# Patient Record
Sex: FEMALE | Race: WHITE | NOT HISPANIC OR LATINO | Employment: FULL TIME | URBAN - METROPOLITAN AREA
[De-identification: names, ages, dates, MRNs, and addresses within clinical notes are randomized per-mention and may not be internally consistent; named-entity substitution may affect disease eponyms.]

---

## 2017-03-07 ENCOUNTER — OFFICE VISIT (OUTPATIENT)
Dept: FAMILY MEDICINE CLINIC | Facility: CLINIC | Age: 47
End: 2017-03-07

## 2017-03-07 VITALS
RESPIRATION RATE: 18 BRPM | BODY MASS INDEX: 24.59 KG/M2 | HEART RATE: 90 BPM | SYSTOLIC BLOOD PRESSURE: 116 MMHG | OXYGEN SATURATION: 97 % | WEIGHT: 144 LBS | DIASTOLIC BLOOD PRESSURE: 74 MMHG | HEIGHT: 64 IN

## 2017-03-07 DIAGNOSIS — G58.9 PINCHED NERVE IN NECK: Primary | ICD-10-CM

## 2017-03-07 PROCEDURE — 99213 OFFICE O/P EST LOW 20 MIN: CPT | Performed by: NURSE PRACTITIONER

## 2017-03-07 PROCEDURE — 96372 THER/PROPH/DIAG INJ SC/IM: CPT | Performed by: NURSE PRACTITIONER

## 2017-03-07 RX ORDER — METHYLPREDNISOLONE ACETATE 80 MG/ML
80 INJECTION, SUSPENSION INTRA-ARTICULAR; INTRALESIONAL; INTRAMUSCULAR; SOFT TISSUE ONCE
Status: COMPLETED | OUTPATIENT
Start: 2017-03-07 | End: 2017-03-07

## 2017-03-07 RX ORDER — METHOCARBAMOL 500 MG/1
500 TABLET, FILM COATED ORAL 3 TIMES DAILY
Qty: 9 TABLET | Refills: 0 | Status: SHIPPED | OUTPATIENT
Start: 2017-03-07 | End: 2017-03-07

## 2017-03-07 RX ORDER — METHOCARBAMOL 500 MG/1
500 TABLET, FILM COATED ORAL 4 TIMES DAILY
Qty: 12 TABLET | Refills: 0 | Status: SHIPPED | OUTPATIENT
Start: 2017-03-07 | End: 2017-03-10

## 2017-03-07 RX ADMIN — METHYLPREDNISOLONE ACETATE 80 MG: 80 INJECTION, SUSPENSION INTRA-ARTICULAR; INTRALESIONAL; INTRAMUSCULAR; SOFT TISSUE at 10:58

## 2017-03-07 NOTE — PROGRESS NOTES
Subjective   Karen Almonte is a 46 y.o. female.   Chief Complaint   Patient presents with   • Neck Pain     can not move neck, trouble sleeping, laying down causes tremendous pain. any pressure is unbarable. pain scale over 10.    • Back Pain     back and shoudler. visible swelling in shoulders      Vitals:    03/07/17 0959   BP: 116/74   Pulse: 90   Resp: 18   SpO2: 97%     Allergies   Allergen Reactions   • Azithromycin Nausea And Vomiting   • Chlorpromazine    • Erythromycin    • Meperidine      HPI Comments: Picked up three tier shelf last Wednesday.   Pain is continuing to get worse. Worse when laying down.   Has done a lot of stretching so she was able to sleep a little.     Neck Pain    This is a new problem. The pain is associated with lifting a heavy object and a twisting injury. The quality of the pain is described as stabbing and burning. The pain is at a severity of 10/10. The pain is severe. The symptoms are aggravated by twisting and bending. The pain is same all the time. Associated symptoms include headaches (occipital ) and tingling. Pertinent negatives include no numbness or weakness. She has tried NSAIDs and heat for the symptoms. The treatment provided moderate relief.        The following portions of the patient's history were reviewed and updated as appropriate: allergies, current medications, past family history, past medical history, past social history, past surgical history and problem list.    Review of Systems   Musculoskeletal: Positive for back pain, myalgias and neck pain.   Neurological: Positive for tingling and headaches (occipital ). Negative for weakness and numbness.   All other systems reviewed and are negative.      Objective   Physical Exam   Constitutional: She is oriented to person, place, and time. She appears well-developed and well-nourished.   HENT:   Head: Normocephalic and atraumatic.   Right Ear: External ear normal.   Left Ear: External ear normal.   Mouth/Throat:  Oropharynx is clear and moist.   Neck: Normal range of motion. Neck supple.   Cardiovascular: Normal rate, regular rhythm and normal heart sounds.    Pulmonary/Chest: Effort normal and breath sounds normal.   Musculoskeletal: Normal range of motion. She exhibits tenderness.        Left shoulder: She exhibits tenderness, pain and spasm. She exhibits normal strength.        Arms:  Neurological: She is alert and oriented to person, place, and time.   Skin: Skin is warm and dry.   Psychiatric: She has a normal mood and affect. Her behavior is normal. Judgment and thought content normal.   Nursing note and vitals reviewed.      Assessment/Plan   Problems Addressed this Visit     None      Visit Diagnoses     Pinched nerve in neck    -  Primary    Relevant Medications    methylPREDNISolone acetate (DEPO-medrol) injection 80 mg (Start on 3/7/2017 11:00 AM)    methocarbamol (ROBAXIN) 500 MG tablet

## 2017-05-19 RX ORDER — GABAPENTIN 600 MG/1
TABLET ORAL
Qty: 270 TABLET | Refills: 0 | Status: SHIPPED | OUTPATIENT
Start: 2017-05-19 | End: 2017-07-06 | Stop reason: SDUPTHER

## 2017-05-24 RX ORDER — DULOXETIN HYDROCHLORIDE 60 MG/1
CAPSULE, DELAYED RELEASE ORAL
Qty: 90 CAPSULE | Refills: 0 | Status: SHIPPED | OUTPATIENT
Start: 2017-05-24 | End: 2017-07-06 | Stop reason: SDUPTHER

## 2017-07-06 RX ORDER — GABAPENTIN 600 MG/1
600 TABLET ORAL 3 TIMES DAILY
Qty: 270 TABLET | Refills: 0 | Status: SHIPPED | OUTPATIENT
Start: 2017-07-06 | End: 2017-10-10 | Stop reason: SDUPTHER

## 2017-07-06 RX ORDER — DULOXETIN HYDROCHLORIDE 60 MG/1
60 CAPSULE, DELAYED RELEASE ORAL DAILY
Qty: 90 CAPSULE | Refills: 1 | Status: SHIPPED | OUTPATIENT
Start: 2017-07-06 | End: 2017-10-10 | Stop reason: SDUPTHER

## 2017-09-12 ENCOUNTER — OFFICE VISIT (OUTPATIENT)
Dept: FAMILY MEDICINE CLINIC | Facility: CLINIC | Age: 47
End: 2017-09-12

## 2017-09-12 VITALS
WEIGHT: 149 LBS | TEMPERATURE: 98.3 F | HEART RATE: 92 BPM | BODY MASS INDEX: 25.44 KG/M2 | HEIGHT: 64 IN | OXYGEN SATURATION: 99 % | DIASTOLIC BLOOD PRESSURE: 68 MMHG | SYSTOLIC BLOOD PRESSURE: 120 MMHG

## 2017-09-12 DIAGNOSIS — R14.0 ABDOMINAL DISTENTION: ICD-10-CM

## 2017-09-12 DIAGNOSIS — K59.09 OTHER CONSTIPATION: ICD-10-CM

## 2017-09-12 DIAGNOSIS — R10.84 GENERALIZED ABDOMINAL PAIN: Primary | ICD-10-CM

## 2017-09-12 DIAGNOSIS — K59.1 FUNCTIONAL DIARRHEA: ICD-10-CM

## 2017-09-12 PROCEDURE — 99213 OFFICE O/P EST LOW 20 MIN: CPT | Performed by: FAMILY MEDICINE
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
## 2017-09-13 ENCOUNTER — APPOINTMENT (OUTPATIENT)
Dept: CT IMAGING | Facility: HOSPITAL | Age: 47
End: 2017-09-13

## 2017-09-13 ENCOUNTER — HOSPITAL ENCOUNTER (OUTPATIENT)
Dept: CT IMAGING | Facility: HOSPITAL | Age: 47
Discharge: HOME | End: 2017-09-13
Admitting: FAMILY MEDICINE

## 2017-09-13 DIAGNOSIS — R10.84 GENERALIZED ABDOMINAL PAIN: ICD-10-CM

## 2017-09-13 DIAGNOSIS — R14.0 ABDOMINAL DISTENTION: ICD-10-CM

## 2017-09-13 PROCEDURE — 74177 CT ABD & PELVIS W/CONTRAST: CPT

## 2017-09-13 PROCEDURE — 0 IOPAMIDOL PER 1 ML: Performed by: FAMILY MEDICINE

## 2017-09-13 RX ADMIN — IOPAMIDOL 100 ML: 755 INJECTION, SOLUTION INTRAVENOUS at 10:45

## 2017-09-13 NOTE — PATIENT INSTRUCTIONS
Recommend increasing dietary fiber even to the point of one or 2 heaping tablespoons of Metamucil per day.  Patient may require GI referral.  Follow-up based on the above results.

## 2017-09-14 DIAGNOSIS — E27.8 ADRENAL NODULE (HCC): ICD-10-CM

## 2017-09-14 DIAGNOSIS — R14.0 ABDOMINAL BLOATING: ICD-10-CM

## 2017-09-14 DIAGNOSIS — E27.8 ADRENAL MASS, LEFT (HCC): Primary | ICD-10-CM

## 2017-09-14 DIAGNOSIS — R10.84 GENERALIZED ABDOMINAL PAIN: ICD-10-CM

## 2017-09-14 LAB
ALBUMIN SERPL-MCNC: 4.6 G/DL (ref 3.5–5.2)
ALBUMIN/GLOB SERPL: 1.7 G/DL
ALP SERPL-CCNC: 95 U/L (ref 40–129)
ALT SERPL-CCNC: 18 U/L (ref 5–33)
AMYLASE SERPL-CCNC: 50 U/L (ref 28–100)
AST SERPL-CCNC: 19 U/L (ref 5–32)
BASOPHILS # BLD AUTO: 0.03 10*3/MM3 (ref 0–0.2)
BASOPHILS NFR BLD AUTO: 0.8 % (ref 0–2)
BILIRUB SERPL-MCNC: 1.1 MG/DL (ref 0.2–1.2)
BUN SERPL-MCNC: 9 MG/DL (ref 6–20)
BUN/CREAT SERPL: 15 (ref 7–25)
CALCIUM SERPL-MCNC: 9.1 MG/DL (ref 8.6–10.5)
CHLORIDE SERPL-SCNC: 100 MMOL/L (ref 98–107)
CO2 SERPL-SCNC: 26.1 MMOL/L (ref 22–29)
CREAT SERPL-MCNC: 0.6 MG/DL (ref 0.57–1)
ENDOMYSIUM IGA SER QL: NEGATIVE
EOSINOPHIL # BLD AUTO: 0.05 10*3/MM3 (ref 0.1–0.3)
EOSINOPHIL NFR BLD AUTO: 1.3 % (ref 0–4)
ERYTHROCYTE [DISTWIDTH] IN BLOOD BY AUTOMATED COUNT: 13.7 % (ref 11.5–14.5)
GLOBULIN SER CALC-MCNC: 2.7 GM/DL
GLUCOSE SERPL-MCNC: 85 MG/DL (ref 65–99)
H PYLORI IGA SER-ACNC: <9 UNITS (ref 0–8.9)
H PYLORI IGG SER IA-ACNC: <0.9 U/ML (ref 0–0.8)
H PYLORI IGM SER-ACNC: <9 UNITS (ref 0–8.9)
HCT VFR BLD AUTO: 48.4 % (ref 37–47)
HGB BLD-MCNC: 15.5 G/DL (ref 12–16)
IGA SERPL-MCNC: 131 MG/DL (ref 87–352)
IMM GRANULOCYTES # BLD: 0.01 10*3/MM3 (ref 0–0.03)
IMM GRANULOCYTES NFR BLD: 0.3 % (ref 0–0.5)
LIPASE SERPL-CCNC: 27 U/L (ref 13–60)
LYMPHOCYTES # BLD AUTO: 1.09 10*3/MM3 (ref 0.6–4.8)
LYMPHOCYTES NFR BLD AUTO: 27.3 % (ref 20–45)
MCH RBC QN AUTO: 30.6 PG (ref 27–31)
MCHC RBC AUTO-ENTMCNC: 32 G/DL (ref 31–37)
MCV RBC AUTO: 95.7 FL (ref 81–99)
MONOCYTES # BLD AUTO: 0.38 10*3/MM3 (ref 0–1)
MONOCYTES NFR BLD AUTO: 9.5 % (ref 3–8)
NEUTROPHILS # BLD AUTO: 2.43 10*3/MM3 (ref 1.5–8.3)
NEUTROPHILS NFR BLD AUTO: 60.8 % (ref 45–70)
NRBC BLD AUTO-RTO: 0 /100 WBC (ref 0–0)
PLATELET # BLD AUTO: 302 10*3/MM3 (ref 140–500)
POTASSIUM SERPL-SCNC: 4.3 MMOL/L (ref 3.5–5.2)
PROT SERPL-MCNC: 7.3 G/DL (ref 6–8.5)
RBC # BLD AUTO: 5.06 10*6/MM3 (ref 4.2–5.4)
SODIUM SERPL-SCNC: 140 MMOL/L (ref 136–145)
TTG IGA SER-ACNC: <2 U/ML (ref 0–3)
WBC # BLD AUTO: 3.99 10*3/MM3 (ref 4.8–10.8)

## 2017-09-25 ENCOUNTER — HOSPITAL ENCOUNTER (OUTPATIENT)
Dept: MRI IMAGING | Facility: HOSPITAL | Age: 47
Discharge: HOME | End: 2017-09-25
Admitting: FAMILY MEDICINE

## 2017-09-25 DIAGNOSIS — E27.8 ADRENAL MASS, LEFT (HCC): ICD-10-CM

## 2017-09-25 DIAGNOSIS — E27.8 ADRENAL NODULE (HCC): ICD-10-CM

## 2017-09-25 PROCEDURE — A9577 INJ MULTIHANCE: HCPCS | Performed by: FAMILY MEDICINE

## 2017-09-25 PROCEDURE — 74183 MRI ABD W/O CNTR FLWD CNTR: CPT

## 2017-09-25 PROCEDURE — 25510000001 GADOBENATE DIMEGLUMINE 529 MG/ML SOLUTION: Performed by: FAMILY MEDICINE

## 2017-09-25 RX ADMIN — GADOBENATE DIMEGLUMINE 13 ML: 529 INJECTION, SOLUTION INTRAVENOUS at 11:34

## 2017-10-10 RX ORDER — GABAPENTIN 600 MG/1
TABLET ORAL
Qty: 270 TABLET | Refills: 1 | Status: SHIPPED | OUTPATIENT
Start: 2017-10-10 | End: 2019-07-25 | Stop reason: SDUPTHER

## 2017-10-10 RX ORDER — DULOXETIN HYDROCHLORIDE 60 MG/1
60 CAPSULE, DELAYED RELEASE ORAL DAILY
Qty: 90 CAPSULE | Refills: 1 | Status: SHIPPED | OUTPATIENT
Start: 2017-10-10 | End: 2018-02-06 | Stop reason: SDUPTHER

## 2018-02-06 RX ORDER — DULOXETIN HYDROCHLORIDE 60 MG/1
CAPSULE, DELAYED RELEASE ORAL
Qty: 90 CAPSULE | Refills: 0 | Status: SHIPPED | OUTPATIENT
Start: 2018-02-06 | End: 2018-06-30 | Stop reason: SDUPTHER

## 2018-05-02 RX ORDER — GABAPENTIN 600 MG/1
TABLET ORAL
Qty: 270 TABLET | OUTPATIENT
Start: 2018-05-02

## 2018-06-19 ENCOUNTER — OFFICE VISIT (OUTPATIENT)
Dept: FAMILY MEDICINE CLINIC | Facility: CLINIC | Age: 48
End: 2018-06-19

## 2018-06-19 VITALS
OXYGEN SATURATION: 98 % | RESPIRATION RATE: 16 BRPM | WEIGHT: 160 LBS | HEART RATE: 81 BPM | DIASTOLIC BLOOD PRESSURE: 70 MMHG | TEMPERATURE: 98.2 F | SYSTOLIC BLOOD PRESSURE: 110 MMHG | BODY MASS INDEX: 27.31 KG/M2 | HEIGHT: 64 IN

## 2018-06-19 DIAGNOSIS — Z12.39 BREAST CANCER SCREENING: ICD-10-CM

## 2018-06-19 DIAGNOSIS — M81.0 OSTEOPOROSIS WITHOUT CURRENT PATHOLOGICAL FRACTURE, UNSPECIFIED OSTEOPOROSIS TYPE: ICD-10-CM

## 2018-06-19 DIAGNOSIS — J01.00 ACUTE NON-RECURRENT MAXILLARY SINUSITIS: Primary | ICD-10-CM

## 2018-06-19 PROCEDURE — 99213 OFFICE O/P EST LOW 20 MIN: CPT | Performed by: FAMILY MEDICINE

## 2018-06-19 RX ORDER — FLUCONAZOLE 150 MG/1
150 TABLET ORAL ONCE
Qty: 1 TABLET | Refills: 0 | Status: SHIPPED | OUTPATIENT
Start: 2018-06-19 | End: 2018-06-19

## 2018-06-19 RX ORDER — AMOXICILLIN AND CLAVULANATE POTASSIUM 875; 125 MG/1; MG/1
1 TABLET, FILM COATED ORAL EVERY 12 HOURS SCHEDULED
Qty: 20 TABLET | Refills: 0 | Status: SHIPPED | OUTPATIENT
Start: 2018-06-19 | End: 2019-07-23

## 2018-06-19 NOTE — PROGRESS NOTES
Subjective   Karen Almonte is a 48 y.o. female with   Chief Complaint   Patient presents with   • Cough     and congestion since memorial day   .    History of Present Illness     Pt is a 47 yo white female presents today with cough and congestion for last 2 weeks.  Cough is productive with a white sputum.  Cough is not worse at night so she is sleeping well.  No fever.  Pt is a smoker and has smoked since she was about 12.  She is to leave tomorrow for vacation.There has been no chest pain, shortness of air or audible wheezing.  Patient denies nausea, vomiting or diarrhea.    The following portions of the patient's history were reviewed and updated as appropriate: allergies, current medications, past family history, past medical history, past social history, past surgical history and problem list.    Review of Systems   Constitutional: Negative for fever.   HENT: Positive for congestion.    Respiratory: Positive for cough.    All other systems reviewed and are negative.      Objective     Vitals:    06/19/18 1441   BP: 110/70   Pulse: 81   Resp: 16   Temp: 98.2 °F (36.8 °C)   SpO2: 98%     BP Readings from Last 3 Encounters:   06/19/18 110/70   09/12/17 120/68   03/07/17 116/74      Wt Readings from Last 3 Encounters:   06/19/18 72.6 kg (160 lb)   09/12/17 67.6 kg (149 lb)   03/07/17 65.3 kg (144 lb)        No results found for this or any previous visit (from the past 168 hour(s)).    Physical Exam   Constitutional: She is oriented to person, place, and time. She appears well-developed and well-nourished.   HENT:   Head: Normocephalic and atraumatic.   Right Ear: Hearing, tympanic membrane, external ear and ear canal normal.   Left Ear: Hearing, tympanic membrane, external ear and ear canal normal.   Nose: Mucosal edema present.   Mouth/Throat: Uvula is midline, oropharynx is clear and moist and mucous membranes are normal.   Neck: Trachea normal and phonation normal. Neck supple. Normal carotid pulses present.  Carotid bruit is not present. No thyroid mass and no thyromegaly present.   Cardiovascular: Normal rate, regular rhythm and normal heart sounds.  Exam reveals no gallop and no friction rub.    No murmur heard.  Pulmonary/Chest: Effort normal and breath sounds normal. No respiratory distress. She has no decreased breath sounds. She has no wheezes. She has no rhonchi. She has no rales.   Lymphadenopathy:     She has no cervical adenopathy.   Neurological: She is alert and oriented to person, place, and time.   Skin: Skin is warm and dry. No rash noted.   Psychiatric: She has a normal mood and affect. Her speech is normal and behavior is normal. Judgment and thought content normal. Cognition and memory are normal.   Nursing note and vitals reviewed.      Assessment/Plan   Karen was seen today for cough.    Diagnoses and all orders for this visit:    Acute non-recurrent maxillary sinusitis    Osteoporosis without current pathological fracture, unspecified osteoporosis type  -     Dexa Bone Density, Axial (Every 2 Years)    Breast cancer screening  -     Mammo Screening, Bilateral (Annually)    Other orders  -     amoxicillin-clavulanate (AUGMENTIN) 875-125 MG per tablet; Take 1 tablet by mouth Every 12 (Twelve) Hours.  -     fluconazole (DIFLUCAN) 150 MG tablet; Take 1 tablet by mouth 1 (One) Time for 1 dose.      Return if symptoms worsen or fail to improve.    Scribed for Andry Patel MD by Apoorva London CMA. 06/19/2018    Andry MIJARES MD personally performed the services described in this documentation, as scribed by Apoorva London CMA in my presence, and it is both accurate and complete

## 2018-07-02 RX ORDER — DULOXETIN HYDROCHLORIDE 60 MG/1
CAPSULE, DELAYED RELEASE ORAL
Qty: 90 CAPSULE | Refills: 0 | Status: SHIPPED | OUTPATIENT
Start: 2018-07-02 | End: 2018-10-05 | Stop reason: SDUPTHER

## 2018-07-20 ENCOUNTER — APPOINTMENT (OUTPATIENT)
Dept: BONE DENSITY | Facility: HOSPITAL | Age: 48
End: 2018-07-20

## 2018-07-20 ENCOUNTER — HOSPITAL ENCOUNTER (OUTPATIENT)
Dept: MAMMOGRAPHY | Facility: HOSPITAL | Age: 48
Discharge: HOME | End: 2018-07-20
Admitting: FAMILY MEDICINE

## 2018-07-20 PROCEDURE — 77067 SCR MAMMO BI INCL CAD: CPT

## 2018-10-05 RX ORDER — DULOXETIN HYDROCHLORIDE 60 MG/1
CAPSULE, DELAYED RELEASE ORAL
Qty: 90 CAPSULE | Refills: 0 | Status: SHIPPED | OUTPATIENT
Start: 2018-10-05 | End: 2018-10-06 | Stop reason: SDUPTHER

## 2018-10-08 RX ORDER — DULOXETIN HYDROCHLORIDE 60 MG/1
CAPSULE, DELAYED RELEASE ORAL
Qty: 90 CAPSULE | Refills: 0 | Status: SHIPPED | OUTPATIENT
Start: 2018-10-08 | End: 2019-04-16 | Stop reason: SDUPTHER

## 2019-04-16 RX ORDER — DULOXETIN HYDROCHLORIDE 60 MG/1
CAPSULE, DELAYED RELEASE ORAL
Qty: 90 CAPSULE | Refills: 0 | Status: SHIPPED | OUTPATIENT
Start: 2019-04-16 | End: 2019-07-12 | Stop reason: SDUPTHER

## 2019-06-06 ENCOUNTER — TELEPHONE (OUTPATIENT)
Dept: FAMILY MEDICINE CLINIC | Facility: CLINIC | Age: 49
End: 2019-06-06

## 2019-06-06 NOTE — TELEPHONE ENCOUNTER
Please call patient to find out how this was filled in 2017 and we are just now getting a request for another refill.

## 2019-06-06 NOTE — TELEPHONE ENCOUNTER
Pharmacy contacted us for a refill on Gabapentin 600mg TID.  She was last seen 6/19/18 but she has an OV 7/23.   This medication was last filled on 10/2017 and was given #270 with 1 refill and has not been filled since.  Do you want to refill?

## 2019-07-12 RX ORDER — DULOXETIN HYDROCHLORIDE 60 MG/1
CAPSULE, DELAYED RELEASE ORAL
Qty: 90 CAPSULE | Refills: 0 | Status: SHIPPED | OUTPATIENT
Start: 2019-07-12 | End: 2019-07-23 | Stop reason: DRUGHIGH

## 2019-07-15 DIAGNOSIS — Z00.00 ENCOUNTER FOR WELLNESS EXAMINATION IN ADULT: Primary | ICD-10-CM

## 2019-07-15 DIAGNOSIS — Z00.00 ROUTINE ADULT HEALTH MAINTENANCE: ICD-10-CM

## 2019-07-17 LAB
ALBUMIN SERPL-MCNC: 4.9 G/DL (ref 3.5–5.2)
ALBUMIN/GLOB SERPL: 2.6 G/DL
ALP SERPL-CCNC: 99 U/L (ref 39–117)
ALT SERPL-CCNC: 21 U/L (ref 1–33)
AST SERPL-CCNC: 19 U/L (ref 1–32)
BASOPHILS # BLD AUTO: 0.04 10*3/MM3 (ref 0–0.2)
BASOPHILS NFR BLD AUTO: 0.6 % (ref 0–1.5)
BILIRUB SERPL-MCNC: 0.7 MG/DL (ref 0.2–1.2)
BUN SERPL-MCNC: 9 MG/DL (ref 6–20)
BUN/CREAT SERPL: 12.7 (ref 7–25)
CALCIUM SERPL-MCNC: 8.8 MG/DL (ref 8.6–10.5)
CHLORIDE SERPL-SCNC: 102 MMOL/L (ref 98–107)
CHOLEST SERPL-MCNC: 214 MG/DL (ref 0–200)
CO2 SERPL-SCNC: 27.5 MMOL/L (ref 22–29)
CREAT SERPL-MCNC: 0.71 MG/DL (ref 0.57–1)
EOSINOPHIL # BLD AUTO: 0.25 10*3/MM3 (ref 0–0.4)
EOSINOPHIL NFR BLD AUTO: 3.6 % (ref 0.3–6.2)
ERYTHROCYTE [DISTWIDTH] IN BLOOD BY AUTOMATED COUNT: 13.7 % (ref 12.3–15.4)
GLOBULIN SER CALC-MCNC: 1.9 GM/DL
GLUCOSE SERPL-MCNC: 106 MG/DL (ref 65–99)
HCT VFR BLD AUTO: 45.7 % (ref 34–46.6)
HDLC SERPL-MCNC: 32 MG/DL (ref 40–60)
HGB BLD-MCNC: 14.4 G/DL (ref 12–15.9)
IMM GRANULOCYTES # BLD AUTO: 0.03 10*3/MM3 (ref 0–0.05)
IMM GRANULOCYTES NFR BLD AUTO: 0.4 % (ref 0–0.5)
LDLC SERPL CALC-MCNC: 142 MG/DL (ref 0–100)
LYMPHOCYTES # BLD AUTO: 1.7 10*3/MM3 (ref 0.7–3.1)
LYMPHOCYTES NFR BLD AUTO: 24.7 % (ref 19.6–45.3)
MCH RBC QN AUTO: 30.4 PG (ref 26.6–33)
MCHC RBC AUTO-ENTMCNC: 31.5 G/DL (ref 31.5–35.7)
MCV RBC AUTO: 96.6 FL (ref 79–97)
MONOCYTES # BLD AUTO: 0.58 10*3/MM3 (ref 0.1–0.9)
MONOCYTES NFR BLD AUTO: 8.4 % (ref 5–12)
NEUTROPHILS # BLD AUTO: 4.28 10*3/MM3 (ref 1.7–7)
NEUTROPHILS NFR BLD AUTO: 62.3 % (ref 42.7–76)
NRBC BLD AUTO-RTO: 0 /100 WBC (ref 0–0.2)
PLATELET # BLD AUTO: 297 10*3/MM3 (ref 140–450)
POTASSIUM SERPL-SCNC: 4.2 MMOL/L (ref 3.5–5.2)
PROT SERPL-MCNC: 6.8 G/DL (ref 6–8.5)
RBC # BLD AUTO: 4.73 10*6/MM3 (ref 3.77–5.28)
SODIUM SERPL-SCNC: 142 MMOL/L (ref 136–145)
TRIGL SERPL-MCNC: 200 MG/DL (ref 0–150)
TSH SERPL DL<=0.005 MIU/L-ACNC: 2.58 MIU/ML (ref 0.27–4.2)
VLDLC SERPL CALC-MCNC: 40 MG/DL
WBC # BLD AUTO: 6.88 10*3/MM3 (ref 3.4–10.8)

## 2019-07-23 ENCOUNTER — OFFICE VISIT (OUTPATIENT)
Dept: FAMILY MEDICINE CLINIC | Facility: CLINIC | Age: 49
End: 2019-07-23

## 2019-07-23 VITALS
HEART RATE: 78 BPM | BODY MASS INDEX: 26.29 KG/M2 | SYSTOLIC BLOOD PRESSURE: 118 MMHG | OXYGEN SATURATION: 98 % | WEIGHT: 154 LBS | HEIGHT: 64 IN | DIASTOLIC BLOOD PRESSURE: 70 MMHG

## 2019-07-23 DIAGNOSIS — R73.02 GLUCOSE INTOLERANCE (IMPAIRED GLUCOSE TOLERANCE): ICD-10-CM

## 2019-07-23 DIAGNOSIS — M79.7 FIBROMYALGIA: ICD-10-CM

## 2019-07-23 DIAGNOSIS — E78.2 MIXED HYPERLIPIDEMIA: ICD-10-CM

## 2019-07-23 DIAGNOSIS — M81.0 AGE-RELATED OSTEOPOROSIS WITHOUT CURRENT PATHOLOGICAL FRACTURE: ICD-10-CM

## 2019-07-23 DIAGNOSIS — Z12.4 PAP SMEAR FOR CERVICAL CANCER SCREENING: ICD-10-CM

## 2019-07-23 DIAGNOSIS — Z00.01 ENCOUNTER FOR WELL ADULT EXAM WITH ABNORMAL FINDINGS: Primary | ICD-10-CM

## 2019-07-23 DIAGNOSIS — R53.83 FATIGUE, UNSPECIFIED TYPE: ICD-10-CM

## 2019-07-23 DIAGNOSIS — F41.8 DEPRESSION WITH ANXIETY: ICD-10-CM

## 2019-07-23 DIAGNOSIS — M79.10 MYALGIA: ICD-10-CM

## 2019-07-23 LAB
BILIRUB BLD-MCNC: NEGATIVE MG/DL
CLARITY, POC: CLEAR
COLOR UR: YELLOW
GLUCOSE UR STRIP-MCNC: NEGATIVE MG/DL
KETONES UR QL: NEGATIVE
LEUKOCYTE EST, POC: NEGATIVE
NITRITE UR-MCNC: NEGATIVE MG/ML
PH UR: 7 [PH] (ref 5–8)
PROT UR STRIP-MCNC: NEGATIVE MG/DL
RBC # UR STRIP: NEGATIVE /UL
SP GR UR: 1 (ref 1–1.03)
UROBILINOGEN UR QL: NORMAL

## 2019-07-23 PROCEDURE — 81002 URINALYSIS NONAUTO W/O SCOPE: CPT | Performed by: FAMILY MEDICINE

## 2019-07-23 PROCEDURE — 99396 PREV VISIT EST AGE 40-64: CPT | Performed by: FAMILY MEDICINE

## 2019-07-23 RX ORDER — GABAPENTIN 600 MG/1
600 TABLET ORAL 3 TIMES DAILY
Qty: 90 TABLET | Refills: 1 | Status: SHIPPED | OUTPATIENT
Start: 2019-07-23 | End: 2019-08-15 | Stop reason: SDUPTHER

## 2019-07-23 NOTE — PROGRESS NOTES
Subjective   Karen Almonte is a 49 y.o. woman who comes in today for a  pap smear only. Patient is a  3 para 3 Ab 0 with last menstrual period of approximately several years  ago.  Her most recent annual exam was more than 2 years ago and showed no abnormalities. Previous abnormal Pap smears: no. Contraception: post menopausal status. Last mammogram 1 year ago. Last Dexa scan 3 years ago.  Patient with history of osteoporosis although uses no treatment.  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  Complains of marked amount of arthralgias and myalgias with increased fatigue.  She especially complains of pain in her midthoracic area leading to the shoulders that seems to be exacerbated by holding her arms up at 90 degrees as she does hair.  She has used Cymbalta at 60 mg daily primarily for fibromyalgia.  She does request to increase the dose of this.  She is also looking for a return to gabapentin which also provided relief in the past.  Time she had been using gabapentin at 600 mg 3 times daily.  There is also history of depression with anxiety features again benefited by Cymbalta.  History of being menopausal.  Patient with fasting labs on 2019.    The following portions of the patient's history were reviewed and updated as appropriate: allergies, current medications, past family history, past medical history, past social history, past surgical history and problem list.    Review of Systems  Pertinent items are noted in HPI.    neck is supple without adenopathy or thyromegaly.  Carotid upstrokes +2 and symmetrical with no evidence of bruit bilaterally.  Heart regular rhythm without murmur gallop or rub.  Lungs clear to auscultation with good bilateral breath sounds.  Abdomen is soft and flat with positive normoactive bowel sounds.  There is no evidence of mass organomegaly.  No evidence of tenderness with deep palpation.  No evidence of guarding or rebound.  Distal pulses are +2  and  "symmetrical.  Breasts are symmetrical with nipples that are inverted/everted and with/without discharge.  Breasts are nontender and without skin changes.  There is no mass palpable.    Objective   /70   Pulse 78   Ht 162.6 cm (64\")   Wt 69.9 kg (154 lb)   SpO2 98%   BMI 26.43 kg/m²   Pelvic Exam: cervix normal in appearance, external genitalia normal, no adnexal masses or tenderness, no bladder tenderness, no cervical motion tenderness, perianal skin: no external genital warts noted, rectovaginal septum normal, urethra without abnormality or discharge, uterus normal size, shape, and consistency and vagina normal without discharge. Pap smear obtained.  Office Visit on 07/23/2019   Component Date Value Ref Range Status   • Color 07/23/2019 Yellow  Yellow, Straw, Dark Yellow, Debbie Final   • Clarity, UA 07/23/2019 Clear  Clear Final   • Glucose, UA 07/23/2019 Negative  Negative, 1000 mg/dL (3+) mg/dL Final   • Bilirubin 07/23/2019 Negative  Negative Final   • Ketones, UA 07/23/2019 Negative  Negative Final   • Specific Gravity  07/23/2019 1.005  1.005 - 1.030 Final   • Blood, UA 07/23/2019 Negative  Negative Final   • pH, Urine 07/23/2019 7.0  5.0 - 8.0 Final   • Protein, POC 07/23/2019 Negative  Negative mg/dL Final   • Urobilinogen, UA 07/23/2019 Normal  Normal Final   • Leukocytes 07/23/2019 Negative  Negative Final   • Nitrite, UA 07/23/2019 Negative  Negative Final   • BRIJESH Direct 07/23/2019 Negative  Negative Final   • Sed Rate 07/23/2019 4  0 - 30 mm/hr Final   • Uric Acid 07/23/2019 3.6  2.4 - 5.7 mg/dL Final   Orders Only on 07/15/2019   Component Date Value Ref Range Status   • WBC 07/16/2019 6.88  3.40 - 10.80 10*3/mm3 Final   • RBC 07/16/2019 4.73  3.77 - 5.28 10*6/mm3 Final   • Hemoglobin 07/16/2019 14.4  12.0 - 15.9 g/dL Final   • Hematocrit 07/16/2019 45.7  34.0 - 46.6 % Final   • MCV 07/16/2019 96.6  79.0 - 97.0 fL Final   • MCH 07/16/2019 30.4  26.6 - 33.0 pg Final   • MCHC 07/16/2019 31.5  " 31.5 - 35.7 g/dL Final   • RDW 07/16/2019 13.7  12.3 - 15.4 % Final   • Platelets 07/16/2019 297  140 - 450 10*3/mm3 Final   • Neutrophil Rel % 07/16/2019 62.3  42.7 - 76.0 % Final   • Lymphocyte Rel % 07/16/2019 24.7  19.6 - 45.3 % Final   • Monocyte Rel % 07/16/2019 8.4  5.0 - 12.0 % Final   • Eosinophil Rel % 07/16/2019 3.6  0.3 - 6.2 % Final   • Basophil Rel % 07/16/2019 0.6  0.0 - 1.5 % Final   • Neutrophils Absolute 07/16/2019 4.28  1.70 - 7.00 10*3/mm3 Final   • Lymphocytes Absolute 07/16/2019 1.70  0.70 - 3.10 10*3/mm3 Final   • Monocytes Absolute 07/16/2019 0.58  0.10 - 0.90 10*3/mm3 Final   • Eosinophils Absolute 07/16/2019 0.25  0.00 - 0.40 10*3/mm3 Final   • Basophils Absolute 07/16/2019 0.04  0.00 - 0.20 10*3/mm3 Final   • Immature Granulocyte Rel % 07/16/2019 0.4  0.0 - 0.5 % Final   • Immature Grans Absolute 07/16/2019 0.03  0.00 - 0.05 10*3/mm3 Final   • nRBC 07/16/2019 0.0  0.0 - 0.2 /100 WBC Final   • Glucose 07/16/2019 106* 65 - 99 mg/dL Final   • BUN 07/16/2019 9  6 - 20 mg/dL Final   • Creatinine 07/16/2019 0.71  0.57 - 1.00 mg/dL Final   • eGFR Non  Am 07/16/2019 87  >60 mL/min/1.73 Final   • eGFR African Am 07/16/2019 105  >60 mL/min/1.73 Final   • BUN/Creatinine Ratio 07/16/2019 12.7  7.0 - 25.0 Final   • Sodium 07/16/2019 142  136 - 145 mmol/L Final   • Potassium 07/16/2019 4.2  3.5 - 5.2 mmol/L Final   • Chloride 07/16/2019 102  98 - 107 mmol/L Final   • Total CO2 07/16/2019 27.5  22.0 - 29.0 mmol/L Final   • Calcium 07/16/2019 8.8  8.6 - 10.5 mg/dL Final   • Total Protein 07/16/2019 6.8  6.0 - 8.5 g/dL Final   • Albumin 07/16/2019 4.90  3.50 - 5.20 g/dL Final   • Globulin 07/16/2019 1.9  gm/dL Final   • A/G Ratio 07/16/2019 2.6  g/dL Final   • Total Bilirubin 07/16/2019 0.7  0.2 - 1.2 mg/dL Final   • Alkaline Phosphatase 07/16/2019 99  39 - 117 U/L Final   • AST (SGOT) 07/16/2019 19  1 - 32 U/L Final   • ALT (SGPT) 07/16/2019 21  1 - 33 U/L Final   • Total Cholesterol 07/16/2019  214* 0 - 200 mg/dL Final   • Triglycerides 07/16/2019 200* 0 - 150 mg/dL Final   • HDL Cholesterol 07/16/2019 32* 40 - 60 mg/dL Final   • VLDL Cholesterol 07/16/2019 40  mg/dL Final   • LDL Cholesterol  07/16/2019 142* 0 - 100 mg/dL Final   • TSH 07/16/2019 2.580  0.270 - 4.200 mIU/mL Final       Assessment/Plan   Screening pap smear.    Follow up in 1 year, or as indicated by Pap results.    Karen was seen today for annual exam and fibromyalgia.    Diagnoses and all orders for this visit:    Encounter for well adult exam with abnormal findings  -     POC Urinalysis Dipstick  -     BRIJESH  -     Sedimentation rate, automated  -     Uric acid  -     gabapentin (NEURONTIN) 600 MG tablet; Take 1 tablet by mouth 3 (Three) Times a Day.    Pap smear for cervical cancer screening  -     Pap IG, Rfx HPV ASCU  -     BRIJESH  -     Sedimentation rate, automated  -     Uric acid  -     gabapentin (NEURONTIN) 600 MG tablet; Take 1 tablet by mouth 3 (Three) Times a Day.    Myalgia  -     BRIJESH  -     Sedimentation rate, automated  -     Uric acid  -     gabapentin (NEURONTIN) 600 MG tablet; Take 1 tablet by mouth 3 (Three) Times a Day.  -     Rheumatoid factor    Fatigue, unspecified type  -     BRIJESH  -     Sedimentation rate, automated  -     Uric acid  -     gabapentin (NEURONTIN) 600 MG tablet; Take 1 tablet by mouth 3 (Three) Times a Day.  -     Rheumatoid factor    Depression with anxiety  -     DULoxetine (CYMBALTA) 30 MG capsule; 3 po qd    Fibromyalgia  -     DULoxetine (CYMBALTA) 30 MG capsule; 3 po qd    Age-related osteoporosis without current pathological fracture    Mixed hyperlipidemia    Glucose intolerance (impaired glucose tolerance)          Current Outpatient Medications:   •  DULoxetine (CYMBALTA) 30 MG capsule, 3 po qd, Disp: 90 capsule, Rfl: 1  •  gabapentin (NEURONTIN) 600 MG tablet, Take 1 tablet by mouth 3 (Three) Times a Day., Disp: 90 tablet, Rfl: 1

## 2019-07-24 LAB
ANA SER QL: NEGATIVE
ERYTHROCYTE [SEDIMENTATION RATE] IN BLOOD BY WESTERGREN METHOD: 4 MM/HR (ref 0–30)
URATE SERPL-MCNC: 3.6 MG/DL (ref 2.4–5.7)

## 2019-07-25 PROBLEM — E78.2 MIXED HYPERLIPIDEMIA: Status: ACTIVE | Noted: 2019-07-25

## 2019-07-25 PROBLEM — R73.02 GLUCOSE INTOLERANCE (IMPAIRED GLUCOSE TOLERANCE): Status: ACTIVE | Noted: 2019-07-25

## 2019-07-25 LAB
CONV .: NORMAL
CYTOLOGIST CVX/VAG CYTO: NORMAL
CYTOLOGY CVX/VAG DOC CYTO: NORMAL
CYTOLOGY CVX/VAG DOC THIN PREP: NORMAL
DX ICD CODE: NORMAL
HIV 1 & 2 AB SER-IMP: NORMAL
OTHER STN SPEC: NORMAL
STAT OF ADQ CVX/VAG CYTO-IMP: NORMAL

## 2019-07-25 RX ORDER — DULOXETIN HYDROCHLORIDE 30 MG/1
CAPSULE, DELAYED RELEASE ORAL
Qty: 90 CAPSULE | Refills: 1 | Status: SHIPPED | OUTPATIENT
Start: 2019-07-25 | End: 2019-08-29 | Stop reason: DRUGHIGH

## 2019-08-15 DIAGNOSIS — Z00.01 ENCOUNTER FOR WELL ADULT EXAM WITH ABNORMAL FINDINGS: ICD-10-CM

## 2019-08-15 DIAGNOSIS — Z12.4 PAP SMEAR FOR CERVICAL CANCER SCREENING: ICD-10-CM

## 2019-08-15 DIAGNOSIS — R53.83 FATIGUE, UNSPECIFIED TYPE: ICD-10-CM

## 2019-08-15 DIAGNOSIS — M79.10 MYALGIA: ICD-10-CM

## 2019-08-15 RX ORDER — GABAPENTIN 600 MG/1
600 TABLET ORAL 3 TIMES DAILY
Qty: 270 TABLET | Refills: 1 | Status: SHIPPED | OUTPATIENT
Start: 2019-08-15 | End: 2019-08-29 | Stop reason: DRUGHIGH

## 2019-08-29 ENCOUNTER — OFFICE VISIT (OUTPATIENT)
Dept: FAMILY MEDICINE CLINIC | Facility: CLINIC | Age: 49
End: 2019-08-29

## 2019-08-29 VITALS
RESPIRATION RATE: 17 BRPM | HEIGHT: 64 IN | DIASTOLIC BLOOD PRESSURE: 70 MMHG | BODY MASS INDEX: 25.78 KG/M2 | TEMPERATURE: 97.6 F | WEIGHT: 151 LBS | HEART RATE: 84 BPM | SYSTOLIC BLOOD PRESSURE: 118 MMHG | OXYGEN SATURATION: 98 %

## 2019-08-29 DIAGNOSIS — M79.7 FIBROMYALGIA: ICD-10-CM

## 2019-08-29 DIAGNOSIS — F41.8 DEPRESSION WITH ANXIETY: ICD-10-CM

## 2019-08-29 DIAGNOSIS — G89.29 CHRONIC BILATERAL LOW BACK PAIN, WITH SCIATICA PRESENCE UNSPECIFIED: ICD-10-CM

## 2019-08-29 DIAGNOSIS — M54.5 CHRONIC BILATERAL LOW BACK PAIN, WITH SCIATICA PRESENCE UNSPECIFIED: ICD-10-CM

## 2019-08-29 DIAGNOSIS — Z79.899 HIGH RISK MEDICATION USE: Primary | ICD-10-CM

## 2019-08-29 DIAGNOSIS — Z79.899 ENCOUNTER FOR LONG-TERM CURRENT USE OF HIGH RISK MEDICATION: ICD-10-CM

## 2019-08-29 DIAGNOSIS — M79.7 FIBROMYALGIA: Primary | ICD-10-CM

## 2019-08-29 PROCEDURE — 99213 OFFICE O/P EST LOW 20 MIN: CPT | Performed by: FAMILY MEDICINE

## 2019-08-29 RX ORDER — GABAPENTIN 800 MG/1
TABLET ORAL
Qty: 120 TABLET | Refills: 5 | Status: SHIPPED | OUTPATIENT
Start: 2019-08-29 | End: 2019-09-26 | Stop reason: SDUPTHER

## 2019-08-29 RX ORDER — DULOXETIN HYDROCHLORIDE 60 MG/1
60 CAPSULE, DELAYED RELEASE ORAL DAILY
Qty: 30 CAPSULE | Refills: 5 | Status: SHIPPED | OUTPATIENT
Start: 2019-08-29 | End: 2019-08-29 | Stop reason: SDUPTHER

## 2019-08-29 RX ORDER — MIRTAZAPINE 15 MG/1
15 TABLET, FILM COATED ORAL NIGHTLY
Qty: 30 TABLET | Refills: 5 | Status: SHIPPED | OUTPATIENT
Start: 2019-08-29 | End: 2019-09-26 | Stop reason: SDUPTHER

## 2019-08-29 RX ORDER — DULOXETIN HYDROCHLORIDE 60 MG/1
60 CAPSULE, DELAYED RELEASE ORAL DAILY
Qty: 90 CAPSULE | Refills: 1 | Status: SHIPPED | OUTPATIENT
Start: 2019-08-29

## 2019-09-01 NOTE — PROGRESS NOTES
Subjective   Kraen Almonte is a 49 y.o. female with   Chief Complaint   Patient presents with   • Back Pain     f/u   .    History of Present Illness   49-year-old white female here in follow-up for fibromyalgia, chronic and persistent low back pain as well as depression with anxiety features.  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  Her arms are often held at a 90 degree angle in abduction while she cuts and styles hair.  She is currently using gabapentin at 600 mg 3 times daily.  She does state at one time that Remeron was used with benefit.  She is already using Cymbalta at 60 mg daily.  Current medications are well-tolerated, used on a regular basis and there have been no side effects.  She understands that there are no curative occasions for her current issue.  The following portions of the patient's history were reviewed and updated as appropriate: allergies, current medications, past family history, past medical history, past social history, past surgical history and problem list.    Review of Systems   Constitutional: Positive for fatigue.   Musculoskeletal: Positive for back pain and myalgias.   Psychiatric/Behavioral: Positive for dysphoric mood and sleep disturbance. Negative for agitation, decreased concentration, self-injury and suicidal ideas. The patient is nervous/anxious.        Objective     Vitals:    08/29/19 1016   BP: 118/70   Pulse: 84   Resp:    Temp:    SpO2: 98%       No results found for this or any previous visit (from the past 672 hour(s)).    Physical Exam   Constitutional: She is oriented to person, place, and time. She appears well-developed and well-nourished.   HENT:   Head: Normocephalic and atraumatic.   Neck: Trachea normal and phonation normal. Neck supple. Normal carotid pulses present. Carotid bruit is not present. No thyroid mass and no thyromegaly present.   Cardiovascular: Normal rate,  regular rhythm and normal heart sounds. Exam reveals no gallop and no friction rub.   No murmur heard.  Pulmonary/Chest: Effort normal and breath sounds normal. No respiratory distress. She has no decreased breath sounds. She has no wheezes. She has no rhonchi. She has no rales.   Lymphadenopathy:     She has no cervical adenopathy.   Neurological: She is alert and oriented to person, place, and time.   Skin: Skin is warm and dry. No rash noted.   Psychiatric: She has a normal mood and affect. Her speech is normal and behavior is normal. Judgment and thought content normal. Cognition and memory are normal.   Nursing note and vitals reviewed.      Assessment/Plan   Karen was seen today for back pain.    Diagnoses and all orders for this visit:    Fibromyalgia  -     gabapentin (NEURONTIN) 800 MG tablet; 1 po qid  -     Discontinue: DULoxetine (CYMBALTA) 60 MG capsule; Take 1 capsule by mouth Daily.  -     mirtazapine (REMERON) 15 MG tablet; Take 1 tablet by mouth Every Night.    Depression with anxiety  -     mirtazapine (REMERON) 15 MG tablet; Take 1 tablet by mouth Every Night.    Chronic bilateral low back pain, with sciatica presence unspecified        Return in about 3 months (around 11/29/2019) for Recheck.

## 2019-09-04 ENCOUNTER — TELEPHONE (OUTPATIENT)
Dept: FAMILY MEDICINE CLINIC | Facility: CLINIC | Age: 49
End: 2019-09-04

## 2019-09-04 LAB — DRUGS UR: NORMAL

## 2019-09-26 DIAGNOSIS — F41.8 DEPRESSION WITH ANXIETY: ICD-10-CM

## 2019-09-26 DIAGNOSIS — M79.7 FIBROMYALGIA: ICD-10-CM

## 2019-09-26 RX ORDER — MIRTAZAPINE 15 MG/1
15 TABLET, FILM COATED ORAL NIGHTLY
Qty: 30 TABLET | Refills: 5 | Status: SHIPPED | OUTPATIENT
Start: 2019-09-26 | End: 2021-01-16

## 2019-09-26 RX ORDER — GABAPENTIN 800 MG/1
TABLET ORAL
Qty: 120 TABLET | Refills: 5 | Status: SHIPPED | OUTPATIENT
Start: 2019-09-26 | End: 2020-05-28

## 2019-09-30 DIAGNOSIS — M79.7 FIBROMYALGIA: ICD-10-CM

## 2019-09-30 DIAGNOSIS — F41.8 DEPRESSION WITH ANXIETY: ICD-10-CM

## 2019-09-30 RX ORDER — DULOXETIN HYDROCHLORIDE 30 MG/1
CAPSULE, DELAYED RELEASE ORAL
Qty: 90 CAPSULE | Refills: 4 | Status: SHIPPED | OUTPATIENT
Start: 2019-09-30 | End: 2020-02-28

## 2020-02-28 DIAGNOSIS — M79.7 FIBROMYALGIA: ICD-10-CM

## 2020-02-28 DIAGNOSIS — F41.8 DEPRESSION WITH ANXIETY: ICD-10-CM

## 2020-02-28 RX ORDER — DULOXETIN HYDROCHLORIDE 30 MG/1
CAPSULE, DELAYED RELEASE ORAL
Qty: 90 CAPSULE | Refills: 0 | Status: SHIPPED | OUTPATIENT
Start: 2020-02-28 | End: 2020-04-06

## 2020-04-06 DIAGNOSIS — M79.7 FIBROMYALGIA: ICD-10-CM

## 2020-04-06 DIAGNOSIS — F41.8 DEPRESSION WITH ANXIETY: ICD-10-CM

## 2020-04-06 RX ORDER — DULOXETIN HYDROCHLORIDE 30 MG/1
CAPSULE, DELAYED RELEASE ORAL
Qty: 90 CAPSULE | Refills: 0 | Status: SHIPPED | OUTPATIENT
Start: 2020-04-06 | End: 2020-06-08

## 2020-05-28 DIAGNOSIS — M79.7 FIBROMYALGIA: ICD-10-CM

## 2020-05-28 RX ORDER — GABAPENTIN 800 MG/1
TABLET ORAL
Qty: 120 TABLET | Refills: 0 | Status: SHIPPED | OUTPATIENT
Start: 2020-05-28 | End: 2020-07-21 | Stop reason: SDUPTHER

## 2020-06-06 DIAGNOSIS — M79.7 FIBROMYALGIA: ICD-10-CM

## 2020-06-06 DIAGNOSIS — F41.8 DEPRESSION WITH ANXIETY: ICD-10-CM

## 2020-06-08 RX ORDER — DULOXETIN HYDROCHLORIDE 30 MG/1
CAPSULE, DELAYED RELEASE ORAL
Qty: 90 CAPSULE | Refills: 0 | Status: SHIPPED | OUTPATIENT
Start: 2020-06-08 | End: 2020-07-21 | Stop reason: SDUPTHER

## 2020-07-01 ENCOUNTER — TELEPHONE (OUTPATIENT)
Dept: FAMILY MEDICINE CLINIC | Facility: CLINIC | Age: 50
End: 2020-07-01

## 2020-07-01 NOTE — TELEPHONE ENCOUNTER
It certainly would be reasonable and you can send them to 1 of the urgent care centers to be tested.

## 2020-07-06 DIAGNOSIS — F41.8 DEPRESSION WITH ANXIETY: ICD-10-CM

## 2020-07-06 DIAGNOSIS — M79.7 FIBROMYALGIA: ICD-10-CM

## 2020-07-06 RX ORDER — DULOXETIN HYDROCHLORIDE 30 MG/1
CAPSULE, DELAYED RELEASE ORAL
Qty: 90 CAPSULE | Refills: 0 | OUTPATIENT
Start: 2020-07-06

## 2020-07-21 DIAGNOSIS — M79.7 FIBROMYALGIA: ICD-10-CM

## 2020-07-21 DIAGNOSIS — F41.8 DEPRESSION WITH ANXIETY: ICD-10-CM

## 2020-07-21 RX ORDER — GABAPENTIN 800 MG/1
TABLET ORAL
Qty: 120 TABLET | Refills: 0 | Status: SHIPPED | OUTPATIENT
Start: 2020-07-21 | End: 2021-01-16

## 2020-07-21 RX ORDER — DULOXETIN HYDROCHLORIDE 30 MG/1
90 CAPSULE, DELAYED RELEASE ORAL DAILY
Qty: 90 CAPSULE | Refills: 0 | Status: SHIPPED | OUTPATIENT
Start: 2020-07-21 | End: 2020-08-14 | Stop reason: SDUPTHER

## 2020-07-21 NOTE — TELEPHONE ENCOUNTER
Caller: Karen Almonte    Relationship: Self    Best call back number: [phone]    Medication needed:   Requested Prescriptions     Pending Prescriptions Disp Refills   • gabapentin (NEURONTIN) 800 MG tablet 120 tablet 0     Sig: TAKE 1 TABLET BY MOUTH FOUR TIMES A DAY   • DULoxetine (CYMBALTA) 30 MG capsule 90 capsule 0     Sig: Take 3 capsules by mouth Daily.       When do you need the refill by: TODAY    What details did the patient provide when requesting the medication: N/A    Does the patient have less than a 3 day supply:  [x] Yes  [] No    What is the patient's preferred pharmacy: [facility]ACY #[id number] - [place], [place] - [address] [address] AT [facility] [address] - [phone] ph - [phone] FX

## 2020-08-14 DIAGNOSIS — F41.8 DEPRESSION WITH ANXIETY: ICD-10-CM

## 2020-08-14 DIAGNOSIS — M79.7 FIBROMYALGIA: ICD-10-CM

## 2020-08-14 RX ORDER — DULOXETIN HYDROCHLORIDE 30 MG/1
90 CAPSULE, DELAYED RELEASE ORAL DAILY
Qty: 90 CAPSULE | Refills: 0 | Status: SHIPPED | OUTPATIENT
Start: 2020-08-14 | End: 2020-10-05 | Stop reason: SDUPTHER

## 2020-09-09 DIAGNOSIS — M79.7 FIBROMYALGIA: ICD-10-CM

## 2020-09-10 RX ORDER — GABAPENTIN 800 MG/1
TABLET ORAL
Qty: 120 TABLET | Refills: 0 | OUTPATIENT
Start: 2020-09-10

## 2020-09-24 ENCOUNTER — TELEPHONE (OUTPATIENT)
Dept: FAMILY MEDICINE CLINIC | Facility: CLINIC | Age: 50
End: 2020-09-24

## 2020-09-24 NOTE — TELEPHONE ENCOUNTER
PATIENT NEEDS REFILL OF:    gabapentin (NEURONTIN) 800 MG tablet    SHE IS COMPLETELY OUT.    [facility].    [facility] PH#: [phone]

## 2020-10-05 DIAGNOSIS — M79.7 FIBROMYALGIA: ICD-10-CM

## 2020-10-05 DIAGNOSIS — F41.8 DEPRESSION WITH ANXIETY: ICD-10-CM

## 2020-10-06 RX ORDER — DULOXETIN HYDROCHLORIDE 30 MG/1
90 CAPSULE, DELAYED RELEASE ORAL DAILY
Qty: 90 CAPSULE | Refills: 0 | Status: SHIPPED | OUTPATIENT
Start: 2020-10-06 | End: 2021-01-16

## 2020-12-20 DIAGNOSIS — M79.7 FIBROMYALGIA: ICD-10-CM

## 2020-12-20 DIAGNOSIS — F41.8 DEPRESSION WITH ANXIETY: ICD-10-CM

## 2020-12-21 RX ORDER — DULOXETIN HYDROCHLORIDE 30 MG/1
CAPSULE, DELAYED RELEASE ORAL
Qty: 90 CAPSULE | Refills: 0 | OUTPATIENT
Start: 2020-12-21